# Patient Record
Sex: MALE | Race: WHITE
[De-identification: names, ages, dates, MRNs, and addresses within clinical notes are randomized per-mention and may not be internally consistent; named-entity substitution may affect disease eponyms.]

---

## 2019-03-08 ENCOUNTER — HOSPITAL ENCOUNTER (OUTPATIENT)
Dept: HOSPITAL 47 - ORWHC2ENDO | Age: 64
Discharge: HOME | End: 2019-03-08
Attending: INTERNAL MEDICINE
Payer: COMMERCIAL

## 2019-03-08 VITALS — RESPIRATION RATE: 16 BRPM

## 2019-03-08 VITALS — HEART RATE: 58 BPM | SYSTOLIC BLOOD PRESSURE: 122 MMHG | DIASTOLIC BLOOD PRESSURE: 80 MMHG

## 2019-03-08 VITALS — BODY MASS INDEX: 38 KG/M2

## 2019-03-08 DIAGNOSIS — Z80.0: ICD-10-CM

## 2019-03-08 DIAGNOSIS — K57.90: ICD-10-CM

## 2019-03-08 DIAGNOSIS — Z79.899: ICD-10-CM

## 2019-03-08 DIAGNOSIS — Z12.11: Primary | ICD-10-CM

## 2019-03-08 PROCEDURE — 45378 DIAGNOSTIC COLONOSCOPY: CPT

## 2019-03-08 NOTE — P.PCN
Date of Procedure: 03/08/19


Procedure(s) Performed: 


BRIEF HISTORY: Patient is a 63-year-old pleasant, white male scheduled for an 

elective colonoscopy as a part of screening for colorectal neoplasia.  He also 

has family history of colon cancer diagnosed in his mother at age 60.





PROCEDURE PERFORMED: Colonoscopy. 





PREOPERATIVE DIAGNOSIS: Screening for colon cancer/family history of colon 

cancer. 





IV sedation per Anesthesia. 





PROCEDURE: After informed consent was obtained, the patient, was brought into 

the endoscopy unit. IV sedation was administered by Anesthesia under continuous 

monitoring.  Digital rectal examination was normal. Initially the Olympus CF-160

flexible video colonoscope was then inserted in the rectum, gradually advanced 

into the cecum without any difficulty. Careful examination was performed as the 

scope was gradually being withdrawn. Ileocecal valve and the appendiceal orifice

were visualized and appeared normal.  Prep was excellent. Mucosa of the cecum, 

ascending colon, transverse colon, descending colon, sigmoid colon, and rectum 

appeared normal.  Moderate sigmoidal diverticulosis seen.  Retroflexion was 

performed in the rectum and no lesions were seen. The patient tolerated the 

procedure well. 





IMPRESSION: 


Normal-appearing colon from rectum to cecum with no evidence of colorectal 

neoplasia


Moderate sigmoidal diverticulosis


 .





RECOMMENDATIONS:  Findings of this examination were discussed with the patient 

as well as his family.  He was advised to have a repeat screening colonoscopy in

5 years because of the family history of colon cancer.

## 2021-06-07 ENCOUNTER — HOSPITAL ENCOUNTER (EMERGENCY)
Dept: HOSPITAL 47 - EC | Age: 66
Discharge: HOME | End: 2021-06-07
Payer: COMMERCIAL

## 2021-06-07 VITALS — RESPIRATION RATE: 18 BRPM | TEMPERATURE: 98.3 F

## 2021-06-07 VITALS — HEART RATE: 66 BPM | DIASTOLIC BLOOD PRESSURE: 79 MMHG | SYSTOLIC BLOOD PRESSURE: 176 MMHG

## 2021-06-07 DIAGNOSIS — R73.9: Primary | ICD-10-CM

## 2021-06-07 DIAGNOSIS — N28.1: ICD-10-CM

## 2021-06-07 DIAGNOSIS — K80.20: ICD-10-CM

## 2021-06-07 DIAGNOSIS — R82.4: ICD-10-CM

## 2021-06-07 DIAGNOSIS — K57.30: ICD-10-CM

## 2021-06-07 DIAGNOSIS — Z79.82: ICD-10-CM

## 2021-06-07 LAB
ALBUMIN SERPL-MCNC: 4 G/DL (ref 3.5–5)
ALP SERPL-CCNC: 68 U/L (ref 38–126)
ALT SERPL-CCNC: 22 U/L (ref 4–49)
AMYLASE SERPL-CCNC: 37 U/L (ref 30–110)
ANION GAP SERPL CALC-SCNC: 9 MMOL/L
AST SERPL-CCNC: 21 U/L (ref 17–59)
BASOPHILS # BLD AUTO: 0 K/UL (ref 0–0.2)
BASOPHILS NFR BLD AUTO: 0 %
BUN SERPL-SCNC: 16 MG/DL (ref 9–20)
CALCIUM SPEC-MCNC: 9.6 MG/DL (ref 8.4–10.2)
CHLORIDE SERPL-SCNC: 100 MMOL/L (ref 98–107)
CO2 SERPL-SCNC: 24 MMOL/L (ref 22–30)
EOSINOPHIL # BLD AUTO: 0 K/UL (ref 0–0.7)
EOSINOPHIL NFR BLD AUTO: 0 %
ERYTHROCYTE [DISTWIDTH] IN BLOOD BY AUTOMATED COUNT: 5.35 M/UL (ref 4.3–5.9)
ERYTHROCYTE [DISTWIDTH] IN BLOOD: 12.2 % (ref 11.5–15.5)
GLUCOSE SERPL-MCNC: 300 MG/DL (ref 74–99)
GLUCOSE UR QL: (no result)
HCT VFR BLD AUTO: 46.8 % (ref 39–53)
HGB BLD-MCNC: 16.2 GM/DL (ref 13–17.5)
LIPASE SERPL-CCNC: 54 U/L (ref 23–300)
LYMPHOCYTES # SPEC AUTO: 1 K/UL (ref 1–4.8)
LYMPHOCYTES NFR SPEC AUTO: 9 %
MCH RBC QN AUTO: 30.3 PG (ref 25–35)
MCHC RBC AUTO-ENTMCNC: 34.7 G/DL (ref 31–37)
MCV RBC AUTO: 87.5 FL (ref 80–100)
MONOCYTES # BLD AUTO: 0.5 K/UL (ref 0–1)
MONOCYTES NFR BLD AUTO: 5 %
NEUTROPHILS # BLD AUTO: 8.8 K/UL (ref 1.3–7.7)
NEUTROPHILS NFR BLD AUTO: 85 %
PH UR: 7 [PH] (ref 5–8)
PLATELET # BLD AUTO: 180 K/UL (ref 150–450)
POTASSIUM SERPL-SCNC: 4.3 MMOL/L (ref 3.5–5.1)
PROT SERPL-MCNC: 6.5 G/DL (ref 6.3–8.2)
SODIUM SERPL-SCNC: 133 MMOL/L (ref 137–145)
SP GR UR: 1.02 (ref 1–1.03)
UROBILINOGEN UR QL STRIP: <2 MG/DL (ref ?–2)
WBC # BLD AUTO: 10.3 K/UL (ref 3.8–10.6)

## 2021-06-07 PROCEDURE — 36415 COLL VENOUS BLD VENIPUNCTURE: CPT

## 2021-06-07 PROCEDURE — 96374 THER/PROPH/DIAG INJ IV PUSH: CPT

## 2021-06-07 PROCEDURE — 83605 ASSAY OF LACTIC ACID: CPT

## 2021-06-07 PROCEDURE — 81003 URINALYSIS AUTO W/O SCOPE: CPT

## 2021-06-07 PROCEDURE — 83690 ASSAY OF LIPASE: CPT

## 2021-06-07 PROCEDURE — 74177 CT ABD & PELVIS W/CONTRAST: CPT

## 2021-06-07 PROCEDURE — 96361 HYDRATE IV INFUSION ADD-ON: CPT

## 2021-06-07 PROCEDURE — 80053 COMPREHEN METABOLIC PANEL: CPT

## 2021-06-07 PROCEDURE — 85025 COMPLETE CBC W/AUTO DIFF WBC: CPT

## 2021-06-07 PROCEDURE — 96375 TX/PRO/DX INJ NEW DRUG ADDON: CPT

## 2021-06-07 PROCEDURE — 99284 EMERGENCY DEPT VISIT MOD MDM: CPT

## 2021-06-07 PROCEDURE — 82150 ASSAY OF AMYLASE: CPT

## 2021-06-07 NOTE — ED
General Adult HPI





- General


Chief complaint: Abdominal Pain


Stated complaint: right side pain


Time Seen by Provider: 06/07/21 08:01


Source: patient, RN notes reviewed


Mode of arrival: ambulatory


Limitations: no limitations





- History of Present Illness


Initial comments: 





85-year-old male with a past medical history of GERD presents to the emergency 

room for chief complaint of right-sided abdominal pain.  Patient states it 

started around 10:00 last night.  States he did have some loose stool that time 

and thought it could be his intestines.  Patient reports it persisted to today 

and he was unable to eat his breakfast because of his nausea.  Patient states it

is the right lower abdomen that radiates to the lower back.  Patient denies 

fevers.  Denies abdominal surgeries.  Patient has been passing gas.  Patient has

no other complaints at this time including shortness of breath, chest pain, 

headache, or visual changes.





- Related Data


                                Home Medications











 Medication  Instructions  Recorded  Confirmed


 


Triamterene-Hctz 37.5-25Mg 1 cap PO DAILY 03/06/19 06/07/21





[Dyazide 37.5-25 Capsule]   


 


Aspirin EC [Ecotrin Low Dose] 81 mg PO DAILY 06/07/21 06/07/21











                                    Allergies











Allergy/AdvReac Type Severity Reaction Status Date / Time


 


No Known Allergies Allergy   Verified 06/07/21 08:45














Review of Systems


ROS Statement: 


Those systems with pertinent positive or pertinent negative responses have been 

documented in the HPI.





ROS Other: All systems not noted in ROS Statement are negative.





Past Medical History


Past Medical History: GERD/Reflux


Additional Past Medical History / Comment(s): ear issues


History of Any Multi-Drug Resistant Organisms: None Reported


Past Surgical History: Ear Surgery


Additional Past Surgical History / Comment(s): Left ear surgery X2.


Past Anesthesia/Blood Transfusion Reactions: No Reported Reaction


Past Psychological History: No Psychological Hx Reported


Smoking Status: Never smoker


Past Alcohol Use History: None Reported


Past Drug Use History: None Reported





- Past Family History


  ** Mother


Family Medical History: Cancer


Additional Family Medical History / Comment(s): Colon cancer.





General Exam


Limitations: no limitations


General appearance: alert, in no apparent distress


Head exam: Present: atraumatic, normocephalic, normal inspection


Eye exam: Present: normal appearance, PERRL, EOMI.  Absent: scleral icterus, 

conjunctival injection, periorbital swelling


ENT exam: Present: normal exam, mucous membranes moist


Neck exam: Present: normal inspection, full ROM.  Absent: tenderness, 

meningismus, lymphadenopathy


Respiratory exam: Present: normal lung sounds bilaterally.  Absent: respiratory 

distress, wheezes, rales, rhonchi, stridor


Cardiovascular Exam: Present: regular rate, normal rhythm, normal heart sounds. 

Absent: systolic murmur, diastolic murmur, rubs, gallop, clicks


GI/Abdominal exam: Present: soft, tenderness (mild RLQ tenderness, no guarding 

or rebound), normal bowel sounds.  Absent: distended, guarding, rebound, rigid





Course


                                   Vital Signs











  06/07/21 06/07/21 06/07/21





  07:49 08:51 09:51


 


Temperature 98.3 F  


 


Pulse Rate 65  


 


Respiratory 18 18 18





Rate   


 


Blood Pressure 187/93  


 


O2 Sat by Pulse 98  





Oximetry   














  06/07/21





  10:00


 


Temperature 


 


Pulse Rate 66


 


Respiratory 18





Rate 


 


Blood Pressure 176/79


 


O2 Sat by Pulse 96





Oximetry 














Medical Decision Making





- Medical Decision Making





Vitals are stable.  Patient is well-appearing.  Patient has some mild mid right 

abdominal tenderness.  Negative Acosta sign.  CBC unremarkable.  CMP does show 

hyperglycemia.  Patient has an appointment with his primary care doctor for 

blood work coming up.  Urinalysis shows 4+ glucose with trace ketones.  CT 

abdomen and pelvis shows a partially exophytic 9.5 cm thin walled cyst of her 

pole right kidney with local mass effect.  Likely the cause of patient's 

symptoms.  There is also gallstones without secondary CT evidence to suggest 

acute cholecystitis.  No fevers.  No transaminitis or abnormal abdominal labs.  

Negative Acosta's sign.  At this time patient is stable for outpatient follow-up

with neurology for kidney cyst as well as surgery for gallstone.  However if he 

starts to develop worsening pain, postprandial pain, or fever to return to the 

emergency room.


I discussed this case with attending Dr. Ro who agrees with this assessment

and treatment plan.





- Lab Data


Result diagrams: 


                                 06/07/21 08:45





                                 06/07/21 08:45


                                   Lab Results











  06/07/21 06/07/21 06/07/21 Range/Units





  08:45 08:45 08:45 


 


WBC  10.3    (3.8-10.6)  k/uL


 


RBC  5.35    (4.30-5.90)  m/uL


 


Hgb  16.2    (13.0-17.5)  gm/dL


 


Hct  46.8    (39.0-53.0)  %


 


MCV  87.5    (80.0-100.0)  fL


 


MCH  30.3    (25.0-35.0)  pg


 


MCHC  34.7    (31.0-37.0)  g/dL


 


RDW  12.2    (11.5-15.5)  %


 


Plt Count  180    (150-450)  k/uL


 


MPV  8.2    


 


Neutrophils %  85    %


 


Lymphocytes %  9    %


 


Monocytes %  5    %


 


Eosinophils %  0    %


 


Basophils %  0    %


 


Neutrophils #  8.8 H    (1.3-7.7)  k/uL


 


Lymphocytes #  1.0    (1.0-4.8)  k/uL


 


Monocytes #  0.5    (0-1.0)  k/uL


 


Eosinophils #  0.0    (0-0.7)  k/uL


 


Basophils #  0.0    (0-0.2)  k/uL


 


Sodium    133 L  (137-145)  mmol/L


 


Potassium    4.3  (3.5-5.1)  mmol/L


 


Chloride    100  ()  mmol/L


 


Carbon Dioxide    24  (22-30)  mmol/L


 


Anion Gap    9  mmol/L


 


BUN    16  (9-20)  mg/dL


 


Creatinine    0.66  (0.66-1.25)  mg/dL


 


Est GFR (CKD-EPI)AfAm    >90  (>60 ml/min/1.73 sqM)  


 


Est GFR (CKD-EPI)NonAf    >90  (>60 ml/min/1.73 sqM)  


 


Glucose    300 H  (74-99)  mg/dL


 


Plasma Lactic Acid Galen     (0.7-2.0)  mmol/L


 


Calcium    9.6  (8.4-10.2)  mg/dL


 


Total Bilirubin    0.6  (0.2-1.3)  mg/dL


 


AST    21  (17-59)  U/L


 


ALT    22  (4-49)  U/L


 


Alkaline Phosphatase    68  ()  U/L


 


Total Protein    6.5  (6.3-8.2)  g/dL


 


Albumin    4.0  (3.5-5.0)  g/dL


 


Amylase    37  ()  U/L


 


Lipase    54  ()  U/L


 


Urine Color   Light Yellow   


 


Urine Appearance   Clear   (Clear)  


 


Urine pH   7.0   (5.0-8.0)  


 


Ur Specific Gravity   1.023   (1.001-1.035)  


 


Urine Protein   Negative   (Negative)  


 


Urine Glucose (UA)   4+ H   (Negative)  


 


Urine Ketones   Trace H   (Negative)  


 


Urine Blood   Negative   (Negative)  


 


Urine Nitrite   Negative   (Negative)  


 


Urine Bilirubin   Negative   (Negative)  


 


Urine Urobilinogen   <2.0   (<2.0)  mg/dL


 


Ur Leukocyte Esterase   Negative   (Negative)  














  06/07/21 Range/Units





  08:45 


 


WBC   (3.8-10.6)  k/uL


 


RBC   (4.30-5.90)  m/uL


 


Hgb   (13.0-17.5)  gm/dL


 


Hct   (39.0-53.0)  %


 


MCV   (80.0-100.0)  fL


 


MCH   (25.0-35.0)  pg


 


MCHC   (31.0-37.0)  g/dL


 


RDW   (11.5-15.5)  %


 


Plt Count   (150-450)  k/uL


 


MPV   


 


Neutrophils %   %


 


Lymphocytes %   %


 


Monocytes %   %


 


Eosinophils %   %


 


Basophils %   %


 


Neutrophils #   (1.3-7.7)  k/uL


 


Lymphocytes #   (1.0-4.8)  k/uL


 


Monocytes #   (0-1.0)  k/uL


 


Eosinophils #   (0-0.7)  k/uL


 


Basophils #   (0-0.2)  k/uL


 


Sodium   (137-145)  mmol/L


 


Potassium   (3.5-5.1)  mmol/L


 


Chloride   ()  mmol/L


 


Carbon Dioxide   (22-30)  mmol/L


 


Anion Gap   mmol/L


 


BUN   (9-20)  mg/dL


 


Creatinine   (0.66-1.25)  mg/dL


 


Est GFR (CKD-EPI)AfAm   (>60 ml/min/1.73 sqM)  


 


Est GFR (CKD-EPI)NonAf   (>60 ml/min/1.73 sqM)  


 


Glucose   (74-99)  mg/dL


 


Plasma Lactic Acid Galen  2.0  (0.7-2.0)  mmol/L


 


Calcium   (8.4-10.2)  mg/dL


 


Total Bilirubin   (0.2-1.3)  mg/dL


 


AST   (17-59)  U/L


 


ALT   (4-49)  U/L


 


Alkaline Phosphatase   ()  U/L


 


Total Protein   (6.3-8.2)  g/dL


 


Albumin   (3.5-5.0)  g/dL


 


Amylase   ()  U/L


 


Lipase   ()  U/L


 


Urine Color   


 


Urine Appearance   (Clear)  


 


Urine pH   (5.0-8.0)  


 


Ur Specific Gravity   (1.001-1.035)  


 


Urine Protein   (Negative)  


 


Urine Glucose (UA)   (Negative)  


 


Urine Ketones   (Negative)  


 


Urine Blood   (Negative)  


 


Urine Nitrite   (Negative)  


 


Urine Bilirubin   (Negative)  


 


Urine Urobilinogen   (<2.0)  mg/dL


 


Ur Leukocyte Esterase   (Negative)  














Disposition


Clinical Impression: 


 Abdominal pain, Hyperglycemia, Renal cyst, Gallstone





Disposition: HOME SELF-CARE


Condition: Good


Instructions (If sedation given, give patient instructions):  Abdominal Pain 

(ED)


Additional Instructions: 


Please take Motrin and Tylenol 3 for pain.  Follow-up with primary care to 

discuss your high sugar levels as well as surgery and urology.  Return to the 

emergency room for any worsening symptoms including worsening abdominal pain, 

worsening vomiting, fevers.


Is patient prescribed a controlled substance at d/c from ED?: No


Referrals: 


Dequan Lorenzo DO [Primary Care Provider] - 1-2 days


Mohan Chacon MD [STAFF PHYSICIAN] - 1-2 days


Camacho Cassidy MD [STAFF PHYSICIAN] - 1-2 days


Time of Disposition: 10:45

## 2021-06-07 NOTE — CT
EXAMINATION TYPE: CT abdomen pelvis w con

 

DATE OF EXAM: 6/7/2021

 

COMPARISON: None.

 

HISTORY: RLQ pain

 

CT DLP: 2440.3 mGycm, Automated Exposure Control for Dose Reduction was Utilized.

 

CONTRAST: 

CT scan of the abdomen and pelvis is performed without oral and with IV Contrast, patient injected wi
th 100 ml mL of Isovue 300.

 

FINDINGS:

 

LUNG BASES:  No significant abnormality is appreciated.

 

LIVER/GB: Low dense intraluminal gallstones with additional low dense rim calcified 1.8 cm dependent 
gallstone in gallbladder neck. Some distended margins of the gallbladder without surrounding inflamma
tory change. Liver diffusely low dense consistent with fatty infiltration. No biliary dilatation.

 

PANCREAS:  No significant abnormality is seen.

 

SPLEEN:  No significant abnormality is seen.

 

ADRENALS:  No significant abnormality is seen.

 

KIDNEYS: Symmetric cortical medullary uptake and excretion without hydronephrosis seen bilaterally. T
here is partially exophytic 9.5 x 8.5 x 8.6 cm thin-walled cyst from the upper pole right kidney.

 

BOWEL: Prominent sigmoid colonic diverticulosis without CT evidence for acute diverticulitis. No susp
icious small or large bowel dilatation. Suboptimal evaluation of bowel without enteric contrast. Mild
 wall thickening in the poorly distended terminal ileum without surrounding fat stranding. Appendix n
ot seen with certainty but no significant fat stranding at the base of cecum. Likely portion of malgorzata
l appendix seen coronal image 53 along medial aspect of cecum.

 

PROSTATE/SEMINAL VESICLES: Some posterior calcifications. Normal size prostate.

 

LYMPH NODES:  No greater than 1cm abdominal or pelvic lymph nodes are appreciated.

 

OSSEOUS STRUCTURES: Bilateral pars defect L5 level. Grade 1 anterolisthesis L5 on S1. Vacuum disc phe
nomenon with moderate disc space narrowing at this level. Vacuum disc phenomenon with mild disc space
 narrowing L4-L5 level.

 

OTHER:  No significant additional abnormality is seen.

 

IMPRESSION:  No convincing CT evidence for acute appendicitis. No bowel obstruction. Gallstones witho
ut secondary CT evidence to suggest acute cholecystitis. Prominent sigmoid colonic diverticulosis wit
hout convincing CT evidence for acute diverticulitis.

Partially exophytic 9.5 cm thin-walled cyst upper pole right kidney with local mass effect.

## 2021-07-12 ENCOUNTER — HOSPITAL ENCOUNTER (OUTPATIENT)
Dept: HOSPITAL 47 - OR | Age: 66
Discharge: HOME | End: 2021-07-12
Attending: SURGERY
Payer: COMMERCIAL

## 2021-07-12 VITALS — BODY MASS INDEX: 38 KG/M2

## 2021-07-12 VITALS — HEART RATE: 47 BPM | SYSTOLIC BLOOD PRESSURE: 145 MMHG | DIASTOLIC BLOOD PRESSURE: 83 MMHG

## 2021-07-12 VITALS — RESPIRATION RATE: 16 BRPM

## 2021-07-12 VITALS — TEMPERATURE: 96.8 F

## 2021-07-12 DIAGNOSIS — Z79.899: ICD-10-CM

## 2021-07-12 DIAGNOSIS — Z98.890: ICD-10-CM

## 2021-07-12 DIAGNOSIS — Z80.0: ICD-10-CM

## 2021-07-12 DIAGNOSIS — Z87.891: ICD-10-CM

## 2021-07-12 DIAGNOSIS — L40.9: ICD-10-CM

## 2021-07-12 DIAGNOSIS — K21.9: ICD-10-CM

## 2021-07-12 DIAGNOSIS — K80.10: Primary | ICD-10-CM

## 2021-07-12 DIAGNOSIS — N28.1: ICD-10-CM

## 2021-07-12 PROCEDURE — 47562 LAPAROSCOPIC CHOLECYSTECTOMY: CPT

## 2021-07-12 PROCEDURE — 88304 TISSUE EXAM BY PATHOLOGIST: CPT

## 2021-07-12 NOTE — P.GSHP
History of Present Illness


H&P Date: 07/12/21


Chief Complaint: Right upper quadrant pain





Is a 65-year-old male who presents today for laparoscopic cholecystectomy.  He's

had completed her oral pain.  His ultrasound shows evidence of cholelithiasis.





Past Medical History


Past Medical History: GERD/Reflux, Skin Disorder


Additional Past Medical History / Comment(s): occ migraines, gallstones, 

psoriasis, cyst rt kidney-benign


History of Any Multi-Drug Resistant Organisms: None Reported


Past Surgical History: Ear Surgery


Additional Past Surgical History / Comment(s): Left ear surgery X2.


Past Anesthesia/Blood Transfusion Reactions: No Reported Reaction


Smoking Status: Former smoker





- Past Family History


  ** Mother


Family Medical History: Cancer


Additional Family Medical History / Comment(s): Colon cancer.





Medications and Allergies


                                Home Medications











 Medication  Instructions  Recorded  Confirmed  Type


 


Triamterene-Hctz 37.5-25Mg 1 cap PO DAILY 03/06/19 07/12/21 History





[Dyazide 37.5-25 Capsule]    


 


Aspirin EC [Ecotrin Low Dose] 81 mg PO DAILY 06/07/21 07/12/21 History








                                    Allergies











Allergy/AdvReac Type Severity Reaction Status Date / Time


 


No Known Allergies Allergy   Verified 07/12/21 08:01














Surgical - Exam


                                   Vital Signs











Temp Pulse Resp BP Pulse Ox


 


 97.5 F L  56 L  16   167/76   96 


 


 07/12/21 07:59  07/12/21 07:59  07/12/21 07:59  07/12/21 07:59  07/12/21 07:59














- General


well developed, well nourished, no distress





- Eyes


PERRL





- ENT


normal pinna





- Neck


no masses





- Respiratory


normal expansion





- Cardiovascular


Rhythm: regular





- Abdomen


Abdomen: soft, non tender





Assessment and Plan


Assessment: 





Right upper quadrant pain





Cholelithiasis





We'll perform laparoscopic cholecystectomy

## 2021-07-12 NOTE — P.OP
Date of Procedure: 07/12/21


Preoperative Diagnosis: 


Cholelithiasis


Postoperative Diagnosis: 


Cholelithiasis


Procedure(s) Performed: 


Laparoscopic cholecystectomy


Anesthesia: SHANITA


Surgeon: Mohan Chacon


Estimated Blood Loss (ml): 5


Pathology: other (Gallbladder)


Condition: stable


Disposition: PACU


Description of Procedure: 


The patient was placed on the operating table.   The patient received a general 

endotracheal tube anesthesia.    The patients abdomen was prepped and draped in

the usual sterile fashion.    Through an infraumbilical stab incision, the 

fascia of the anterior abdominal wall was grasped with a pair of Kochers and 

then the Veress needle was placed in the peritoneal cavity.   Position of the 

Veress needle was confirmed with positive drop test.   The abdomen was then 

insufflated.  After adequate insufflation, the 10 mm trocar was placed in the 

peritoneal cavity.    Following this the laparoscope was placed in the 

peritoneal cavity. The patient was placed in the head-up, right side up position

and then a 5 mm trocar was placed in the right lateral and right subcostal 

position under direct visualization.    A 8 mm trocar was placed in the 

epigastric position.  The gallbladder was grasped in the fundus and 

infundibulum.  Traction  on the gallbladder was placed in the lateral and the 

cephalad positions.  The triangle of Calot  was visualized..   The cystic duct 

was bluntly dissected until the union of the cystic duct and common bile duct 

was seen.   A critical view of safety was achieved.  The cystic duct was then 

divided and sealed with the Harmonic scissors.  A PDS Endoloop was then placed 

throughout the cystic duct stump.  The cystic artery divided and sealed with the

Harmonic scissors.   The gallbladder was then removed from the liver bed using 

Harmonic scissors.   The gallbladder was then extracted through the epigastric 

port site.  Operative field was checked for any bleeding spots and Harmonic 

scissors was used to coagulate the liver bed.    The abdomen was irrigated.   

The trocars were removed.  The skin was closed using interrupted 3-0 Vicryl 

suture.   Dermabond dressing were applied.   The patient tolerated the procedure

well.

## 2024-12-11 ENCOUNTER — HOSPITAL ENCOUNTER (OUTPATIENT)
Dept: HOSPITAL 47 - ORWHC2ENDO | Age: 69
Discharge: HOME | End: 2024-12-11
Attending: INTERNAL MEDICINE
Payer: COMMERCIAL

## 2024-12-11 VITALS — TEMPERATURE: 97.9 F

## 2024-12-11 VITALS — DIASTOLIC BLOOD PRESSURE: 65 MMHG | SYSTOLIC BLOOD PRESSURE: 101 MMHG

## 2024-12-11 VITALS — HEART RATE: 52 BPM | RESPIRATION RATE: 16 BRPM

## 2024-12-11 DIAGNOSIS — Z79.82: ICD-10-CM

## 2024-12-11 DIAGNOSIS — Z79.899: ICD-10-CM

## 2024-12-11 DIAGNOSIS — G43.909: ICD-10-CM

## 2024-12-11 DIAGNOSIS — I10: ICD-10-CM

## 2024-12-11 DIAGNOSIS — Z79.84: ICD-10-CM

## 2024-12-11 DIAGNOSIS — Z12.11: Primary | ICD-10-CM

## 2024-12-11 DIAGNOSIS — Z80.0: ICD-10-CM

## 2024-12-11 DIAGNOSIS — E11.9: ICD-10-CM

## 2024-12-11 DIAGNOSIS — L40.9: ICD-10-CM

## 2024-12-11 DIAGNOSIS — K21.9: ICD-10-CM

## 2024-12-11 DIAGNOSIS — Z86.0100: ICD-10-CM

## 2024-12-11 DIAGNOSIS — K57.30: ICD-10-CM

## 2024-12-11 LAB — GLUCOSE BLD-MCNC: 108 MG/DL (ref 70–110)

## 2024-12-11 PROCEDURE — 45378 DIAGNOSTIC COLONOSCOPY: CPT

## 2024-12-11 RX ADMIN — POTASSIUM CHLORIDE SCH MLS/HR: 14.9 INJECTION, SOLUTION INTRAVENOUS at 11:12

## 2024-12-11 RX ADMIN — POTASSIUM CHLORIDE ONE MLS: 14.9 INJECTION, SOLUTION INTRAVENOUS at 11:06

## 2024-12-11 NOTE — P.PCN
Date of Procedure: 12/11/24


Procedure(s) Performed: 


BRIEF HISTORY: Patient is a 69-year-old pleasant white male scheduled for an 

elective colonoscopy as a part of valuation by history of colon polyps.  His 

mother was diagnosed with colon cancer at age 60.





PROCEDURE PERFORMED: Colonoscopy. 





PREOPERATIVE DIAGNOSIS: History of colon polyps and family history of colon 

cancer. 





IV sedation per Anesthesia. 





PROCEDURE: After informed consent was obtained, the patient, was brought into 

the endoscopy unit. IV sedation was administered by Anesthesia under continuous 

monitoring.  Digital rectal examination was normal. Initially the Olympus CF-160

flexible video colonoscope was then inserted in the rectum, gradually advanced 

into the cecum without any difficulty. Careful examination was performed as the 

scope was gradually being withdrawn. Ileocecal valve and the appendiceal orifice

were visualized and appeared normal.  Prep was excellent. Mucosa of the cecum, 

ascending colon, transverse colon, descending colon, sigmoid colon, and rectum 

appeared normal.  Sigmoid diverticulosis.  Retroflexion was performed in the 

rectum and no lesions were seen. The patient tolerated the procedure well. 





IMPRESSION: 


Normal-appearing colon from rectum to cecum with no evidence of colorectal 

neoplasia.


Sigmoid diverticulosis.





RECOMMENDATIONS:  Findings of this examination were discussed with the patient 

as well as his family.  He was advised to have repeat colonoscopy in 5 years 

because of the family history of colon cancer.